# Patient Record
Sex: FEMALE | Race: WHITE
[De-identification: names, ages, dates, MRNs, and addresses within clinical notes are randomized per-mention and may not be internally consistent; named-entity substitution may affect disease eponyms.]

---

## 2017-04-13 ENCOUNTER — HOSPITAL ENCOUNTER (OUTPATIENT)
Age: 57
Discharge: HOME | End: 2017-04-13
Payer: COMMERCIAL

## 2017-04-13 DIAGNOSIS — R07.81: Primary | ICD-10-CM

## 2019-06-18 ENCOUNTER — HOSPITAL ENCOUNTER (EMERGENCY)
Dept: HOSPITAL 76 - ED | Age: 59
Discharge: HOME | End: 2019-06-18
Payer: COMMERCIAL

## 2019-06-18 VITALS — SYSTOLIC BLOOD PRESSURE: 124 MMHG | DIASTOLIC BLOOD PRESSURE: 86 MMHG
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
## 2019-06-18 DIAGNOSIS — S93.491A: Primary | ICD-10-CM

## 2019-06-18 DIAGNOSIS — S93.401A: ICD-10-CM

## 2019-06-18 PROCEDURE — 99283 EMERGENCY DEPT VISIT LOW MDM: CPT

## 2019-06-18 PROCEDURE — 73610 X-RAY EXAM OF ANKLE: CPT

## 2019-06-18 PROCEDURE — 99282 EMERGENCY DEPT VISIT SF MDM: CPT

## 2019-06-18 NOTE — XRAY REPORT
Reason:  pain, fall,twisted

Procedure Date:  06/18/2019   

Accession Number:  [id number] / [id number]                    

Procedure:  XR  - Ankle 3 View RT CPT Code:  

 

FULL RESULT:

 

 

EXAM:

RIGHT ANKLE RADIOGRAPHY

 

EXAM DATE: 6/18/2019 06:18 PM.

 

CLINICAL HISTORY: Pain, fall,twisted.

 

COMPARISON: None.

 

TECHNIQUE: 3 views.

 

FINDINGS:

Bones: Normal. No fractures or bone lesions.

 

Joints: Normal. No effusion. No subluxations. The ankle mortise is 

normally aligned.

 

Soft Tissues: There is ossification of the inferior Achilles tendon.

IMPRESSION: Negative for fracture and subluxation.

 

RADIA

## 2019-06-18 NOTE — ED PHYSICIAN DOCUMENTATION
PD HPI LOWER EXT INJURY





- Stated complaint


Stated Complaint: RT FOOT PX





- Chief complaint


Chief Complaint: Trauma Ext





- History obtained from


History obtained from: Patient





- History of Present Illness


PD HPI LOW EXT INJURY LOCATION: Right, Ankle, Foot


Type of injury: Twist (inversion)


Where injury occurred: Work


Timing - onset: Today


Timing - details: Abrupt onset, Still present


Worsened by: Moving, Other (walking)


Associated symptoms: Swelling.  No: Weakness, Numbness


Similar symptoms before: Has not had sx before





Review of Systems


Skin: denies: Abrasion (s), Laceration (s)


Musculoskeletal: reports: Joint swelling (right ankle)


Neurologic: denies: Focal weakness, Numbness





PD PAST MEDICAL HISTORY





- Past Medical History


Past Medical History: No





- Past Surgical History


Past Surgical History: No





- Allergies


Allergies/Adverse Reactions: 


                                    Allergies











Allergy/AdvReac Type Severity Reaction Status Date / Time


 


No Known Drug Allergies Allergy   Verified 06/18/19 18:00














- Social History


Does the pt smoke?: No


Smoking Status: Never smoker


Does the pt drink ETOH?: Yes


ETOH Use: Beer


Does the pt have substance abuse?: No





PD ED PE NORMAL





- Vitals


Vital signs reviewed: Yes





- General


General: Alert and oriented X 3, No acute distress, Well developed/nourished





- Derm


Derm: Normal color, Warm and dry





- Extremities


Extremities: Other (right ankle with tenderness right anterolateral aspect, with

swelling. Medial and posterior not tender. )





- Neuro


Neuro: No motor deficit, No sensory deficit





Results





- Vitals


Vitals: 


                               Vital Signs - 24 hr











  06/18/19





  17:58


 


Temperature 37.0 C


 


Heart Rate 116 H


 


Respiratory 18





Rate 


 


Blood Pressure 124/86 H


 


O2 Saturation 100








                                     Oxygen











O2 Source                      Room air

















- Rads (name of study)


  ** right ankle


Radiology: Prelim report reviewed (no fractures), See rad report





PD MEDICAL DECISION MAKING





- ED course


Complexity details: reviewed results, considered differential, d/w patient





Departure





- Departure


Disposition: 01 Home, Self Care


Clinical Impression: 


Ankle sprain


Qualifiers:


 Encounter type: initial encounter Involved ligament of ankle: anterior 

talofibular ligament Laterality: right Qualified Code(s): S93.491A - Sprain of 

other ligament of right ankle, initial encounter





Condition: Stable


Record reviewed to determine appropriate education?: Yes


Instructions:  ED Sprain Ankle


Follow-Up: 


Gretchen Stein MD [Primary Care Provider] - 


Comments: 


Tylenol ibuprofen or naproxen as needed for pains.  Elevate rest and ice the 

foot often tonight and tomorrow morning to reduce swelling.  Use the ankle brace

when up and around for the next couple of weeks until fully healed.  Crutches 

initially if needed for discomfort of walking and progress weightbearing as 

able.  Recheck if not improved over the over the next several days to week or 

fully better by a couple of weeks.


Forms:  Activity restrictions


Discharge Date/Time: 06/18/19 19:46

## 2023-07-19 ENCOUNTER — HOSPITAL ENCOUNTER (EMERGENCY)
Age: 63
Discharge: ANOTHER HOSPITAL | End: 2023-07-19
Payer: COMMERCIAL

## 2023-07-19 VITALS
RESPIRATION RATE: 19 BRPM | OXYGEN SATURATION: 97 % | SYSTOLIC BLOOD PRESSURE: 134 MMHG | DIASTOLIC BLOOD PRESSURE: 69 MMHG | HEART RATE: 71 BPM

## 2023-07-19 VITALS
OXYGEN SATURATION: 97 % | SYSTOLIC BLOOD PRESSURE: 121 MMHG | DIASTOLIC BLOOD PRESSURE: 68 MMHG | TEMPERATURE: 98.24 F | RESPIRATION RATE: 20 BRPM | HEART RATE: 89 BPM

## 2023-07-19 VITALS
HEART RATE: 66 BPM | OXYGEN SATURATION: 96 % | DIASTOLIC BLOOD PRESSURE: 68 MMHG | RESPIRATION RATE: 15 BRPM | SYSTOLIC BLOOD PRESSURE: 138 MMHG

## 2023-07-19 VITALS
SYSTOLIC BLOOD PRESSURE: 140 MMHG | RESPIRATION RATE: 20 BRPM | DIASTOLIC BLOOD PRESSURE: 69 MMHG | OXYGEN SATURATION: 96 % | HEART RATE: 69 BPM

## 2023-07-19 VITALS
DIASTOLIC BLOOD PRESSURE: 67 MMHG | OXYGEN SATURATION: 96 % | HEART RATE: 67 BPM | RESPIRATION RATE: 16 BRPM | SYSTOLIC BLOOD PRESSURE: 135 MMHG

## 2023-07-19 VITALS — OXYGEN SATURATION: 96 % | HEART RATE: 78 BPM

## 2023-07-19 VITALS
OXYGEN SATURATION: 94 % | HEART RATE: 73 BPM | RESPIRATION RATE: 16 BRPM | SYSTOLIC BLOOD PRESSURE: 133 MMHG | DIASTOLIC BLOOD PRESSURE: 75 MMHG

## 2023-07-19 VITALS
OXYGEN SATURATION: 97 % | SYSTOLIC BLOOD PRESSURE: 138 MMHG | RESPIRATION RATE: 16 BRPM | HEART RATE: 70 BPM | DIASTOLIC BLOOD PRESSURE: 72 MMHG

## 2023-07-19 VITALS
DIASTOLIC BLOOD PRESSURE: 70 MMHG | OXYGEN SATURATION: 97 % | HEART RATE: 72 BPM | RESPIRATION RATE: 19 BRPM | SYSTOLIC BLOOD PRESSURE: 143 MMHG

## 2023-07-19 VITALS — RESPIRATION RATE: 21 BRPM | HEART RATE: 72 BPM

## 2023-07-19 VITALS — RESPIRATION RATE: 21 BRPM | HEART RATE: 75 BPM | OXYGEN SATURATION: 97 %

## 2023-07-19 VITALS — HEART RATE: 78 BPM | OXYGEN SATURATION: 97 % | RESPIRATION RATE: 21 BRPM

## 2023-07-19 VITALS — BODY MASS INDEX: 21.2 KG/M2

## 2023-07-19 DIAGNOSIS — S06.6XAA: Primary | ICD-10-CM
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
## 2023-07-19 LAB
ADD MANUAL DIFF / SLIDE REVIEW: NO
ALBUMIN SERPL-MCNC: 4.1 G/DL (ref 3.5–5)
ALBUMIN/GLOB SERPL: 1.2 {RATIO} (ref 1–2.8)
ALP SERPL-CCNC: 64 U/L (ref 38–126)
ALT SERPL-CCNC: 50 IU/L (ref ?–35)
BUN SERPL-MCNC: 16 MG/DL (ref 7–17)
CALCIUM SERPL-MCNC: 9 MG/DL (ref 8.4–10.2)
CHLORIDE SERPL-SCNC: 99 MMOL/L (ref 98–107)
CO2 SERPL-SCNC: 29 MMOL/L (ref 22–32)
ESTIMATED GLOMERULAR FILT RATE: > 60 ML/MIN (ref 60–?)
ETHANOL SERPL-MCNC: < 10 MG/DL
GLOBULIN SER CALC-MCNC: 3.4 G/DL (ref 1.7–4.1)
GLUCOSE SERPL-MCNC: 111 MG/DL (ref 80–110)
HEMATOCRIT: 37.6 % (ref 36–46)
HEMOGLOBIN: 12.8 G/DL (ref 12–16)
HEMOLYSIS: < 15 (ref 0–50)
INR PPP: 1.2 (ref 0.9–1.3)
LACTATE SERPL-MCNC: 1.4 MMOL/L (ref 0.7–2.1)
LIPASE SERPL-CCNC: 211 U/L (ref 23–300)
LYMPHOCYTES # SPEC AUTO: 1000 /UL (ref 1100–4500)
MCV RBC: 87.8 FL (ref 80–100)
MEAN CORPUSCULAR HEMOGLOBIN: 29.9 PG (ref 26–34)
MEAN CORPUSCULAR HGB CONC: 34 % (ref 30–36)
PLATELET COUNT: 262 X10^3/UL (ref 150–400)
POTASSIUM SERPL-SCNC: 3.1 MMOL/L (ref 3.4–5.1)
PROT SERPL-MCNC: 7.5 G/DL (ref 6.3–8.2)
PROTHROMBIN TIME: 13.9 SECONDS (ref 10.1–12.7)
PTT PARTIAL THROMBOPLASTIN TIM: 31 SECONDS (ref 26–36)
SODIUM SERPL-SCNC: 137 MMOL/L (ref 137–145)

## 2023-07-19 PROCEDURE — 85025 COMPLETE CBC W/AUTO DIFF WBC: CPT

## 2023-07-19 PROCEDURE — 70450 CT HEAD/BRAIN W/O DYE: CPT

## 2023-07-19 PROCEDURE — 85610 PROTHROMBIN TIME: CPT

## 2023-07-19 PROCEDURE — 99284 EMERGENCY DEPT VISIT MOD MDM: CPT

## 2023-07-19 PROCEDURE — 96374 THER/PROPH/DIAG INJ IV PUSH: CPT

## 2023-07-19 PROCEDURE — 83690 ASSAY OF LIPASE: CPT

## 2023-07-19 PROCEDURE — 96375 TX/PRO/DX INJ NEW DRUG ADDON: CPT

## 2023-07-19 PROCEDURE — 85730 THROMBOPLASTIN TIME PARTIAL: CPT

## 2023-07-19 PROCEDURE — 83605 ASSAY OF LACTIC ACID: CPT

## 2023-07-19 PROCEDURE — 86901 BLOOD TYPING SEROLOGIC RH(D): CPT

## 2023-07-19 PROCEDURE — 99292 CRITICAL CARE ADDL 30 MIN: CPT

## 2023-07-19 PROCEDURE — 36415 COLL VENOUS BLD VENIPUNCTURE: CPT

## 2023-07-19 PROCEDURE — 99291 CRITICAL CARE FIRST HOUR: CPT

## 2023-07-19 PROCEDURE — 86850 RBC ANTIBODY SCREEN: CPT

## 2023-07-19 PROCEDURE — 96376 TX/PRO/DX INJ SAME DRUG ADON: CPT

## 2023-07-19 PROCEDURE — 86900 BLOOD TYPING SEROLOGIC ABO: CPT

## 2023-07-19 PROCEDURE — 80053 COMPREHEN METABOLIC PANEL: CPT

## 2023-07-19 PROCEDURE — 80320 DRUG SCREEN QUANTALCOHOLS: CPT

## 2023-07-19 PROCEDURE — 93005 ELECTROCARDIOGRAM TRACING: CPT

## 2023-08-31 ENCOUNTER — HOSPITAL ENCOUNTER (OUTPATIENT)
Dept: HOSPITAL 76 - DI | Age: 63
Discharge: HOME | End: 2023-08-31
Payer: COMMERCIAL

## 2023-08-31 DIAGNOSIS — S06.5XAD: Primary | ICD-10-CM

## 2023-08-31 NOTE — CT REPORT
PROCEDURE:  HEAD WO

 

INDICATIONS:  SUBDURAL HEMATOMA

 

TECHNIQUE:  

Noncontrast 4.5 mm thick angled axial sections acquired from the foramen magnum to the vertex.  For r
adiation dose reduction, the following was used:  automated exposure control, adjustment of mA and/or
 kV according to patient size.

 

COMPARISON:  None.

 

FINDINGS:  

Image quality:  Excellent.  

 

CSF spaces:  Basal cisterns are patent. Mild extra axial fluid deep to the craniectomy..  Ventricles 
are normal in size and shape.  

 

Brain: There is approximately 6 mm of leftward midline shift. There is slightly sunken appearance to 
the right frontal and temporal lobes. Right anterior frontal and anterior temporal encephalomalacia a
nd gliosis. No intracranial masses or hemorrhage.  Gray-white matter interface is normal.  

 

Skull and face: Status post right-sided decompressive craniectomy.

 

Sinuses:  Visualized sinuses and mastoids are clear.  

 

 

IMPRESSION:  

1.Postsurgical changes from right decompressive craniectomy. There is asymmetric appearance to the ri
ght frontal and temporal lobes, likely secondary to chronic craniectomy. There is approximately 6 mm 
of leftward midline shift.

2.Encephalomalacia and gliosis within the right anterior frontal and anterior temporal lobes, correla
te with history of trauma.

3.Otherwise, no acute intracranial hemorrhage or large loss of gray-white differentiation.

4.Recommend correlation with prior imaging to assess stability of findings.

 

 

 

Reviewed by: Elmer Jaime MD on 8/31/2023 12:25 PM PDT

Approved by: Elmer Jaime MD on 8/31/2023 12:25 PM PDT

 

 

Station ID:  [id number]